# Patient Record
Sex: MALE | Race: WHITE
[De-identification: names, ages, dates, MRNs, and addresses within clinical notes are randomized per-mention and may not be internally consistent; named-entity substitution may affect disease eponyms.]

---

## 2020-11-10 PROBLEM — Z00.00 ENCOUNTER FOR PREVENTIVE HEALTH EXAMINATION: Status: ACTIVE | Noted: 2020-11-10

## 2020-11-12 ENCOUNTER — APPOINTMENT (OUTPATIENT)
Dept: ORTHOPEDIC SURGERY | Facility: CLINIC | Age: 55
End: 2020-11-12
Payer: COMMERCIAL

## 2020-11-12 VITALS
BODY MASS INDEX: 31.49 KG/M2 | HEIGHT: 65 IN | SYSTOLIC BLOOD PRESSURE: 133 MMHG | TEMPERATURE: 97.1 F | HEART RATE: 67 BPM | WEIGHT: 189 LBS | DIASTOLIC BLOOD PRESSURE: 83 MMHG

## 2020-11-12 DIAGNOSIS — M75.41 IMPINGEMENT SYNDROME OF RIGHT SHOULDER: ICD-10-CM

## 2020-11-12 DIAGNOSIS — Z78.9 OTHER SPECIFIED HEALTH STATUS: ICD-10-CM

## 2020-11-12 PROCEDURE — 99203 OFFICE O/P NEW LOW 30 MIN: CPT

## 2020-11-12 PROCEDURE — 73030 X-RAY EXAM OF SHOULDER: CPT | Mod: RT

## 2020-11-16 NOTE — PHYSICAL EXAM
[de-identified] : Right Shoulder:  \par Range of Motion in Degrees:	\par 	                                  Claimant:	Normal:	\par Abduction (Active)	                  180	               180 degrees	\par Abduction (Passive)	  180	               180 degrees	\par Forward elevation (Active):	  180	               180 degrees	\par Forward elevation (Passive):	  180	               180 degrees	\par External rotation (Active):	   45	               45 degrees	\par External rotation (Passive):	   45	               45 degrees	\par Internal rotation (Active):	   L-1	               L-1	\par Internal rotation (Passive):	   L-1	               L-1	\par  \par No motor weakness to internal rotation, external rotation or abduction in the scapular plane.  Negative crank test.  Negative O’Luis’s test.  Negative Speed’s test. Negative Yergason’s test.  Negative cross arm test.  No tenderness to palpation at the AC joint. Positive Hawkin’s sign.  Positive Neer’s sign. Negative apprehension. Negative sulcus sign.  No gross neurological or vascular deficits distally.  Skin is intact.  No rashes, scars or lesions. 2+ radial and ulnar pulses. No extra-articular swelling or tenderness.\par  \par Left Shoulder: \par Range of Motion in Degrees:   	\par    	                        Claimant:  	Normal:  	\par Abduction (Active)  	180  	180 degrees  	\par Abduction (Passive)  	180  	180 degrees  	\par Forward elevation (Active):  	180  	180 degrees  	\par Forward elevation (Passive):  180  	180 degrees  	\par External rotation (Active):  	45  	45 degrees  	\par External rotation (Passive):  	45  	45 degrees  	\par Internal rotation (Active):  	L-1  	L-1  	\par Internal rotation (Passive):  	L-1  	L-1  \par 	\par No motor weakness to internal rotation, external rotation or abduction in the scapular plane.  Negative crank test.  Negative O’Luis’s test.  Negative Speed’s test. Negative Yergason’s test.  Negative cross arm test.  No tenderness to palpation at the AC joint. Negative Hawkin’s sign.  Negative Neer’s sign.  Negative apprehension. Negative sulcus sign.  No gross neurological or vascular deficits distally.  Skin is intact.  No rashes, scars or lesions. 2+ radial and ulnar pulses. No extra-articular swelling or tenderness.\par   [de-identified] : Ambulating with a normal gait.  Station:  Normal.  [de-identified] : Appearance:  Well-developed, well-nourished male in no acute distress.\par   [de-identified] : Radiographs, two views of the right shoulder, reveals no acute fractures or dislocations noted.  He does look like he has some old anchors in there from an old surgery.

## 2020-11-16 NOTE — HISTORY OF PRESENT ILLNESS
[(Does not interfere) 0] : the ailment interference is 0/10 (does not interfere) [5] : the ailment interference is 5/10 [de-identified] : The patient comes in today with complaints of right shoulder pain.  The patient states the onset/injury occurred on 10/12/20.  This injury is not work related or due to an automobile accident.  The patient states the pain is sharp.  The patient describes the pain as sharp.  The patient notes ice makes his symptoms better, while using the shoulder makes his symptoms worse.  The patient indicates a pain level of 2 on a pain scale of 0-10.    [] : No

## 2020-11-16 NOTE — DISCUSSION/SUMMARY
[de-identified] : At this time, due to impingement of the right shoulder, the patient was given a cortisone injection and advised in ice and anti-inflammatories.  He will return to the office.

## 2020-11-16 NOTE — ADDENDUM
[FreeTextEntry1] : This note was dictated by Nargis Chong, OTR/L, PA \par \par This note was written by Angi Bailey on 11/16/2020 acting as a scribe for BRADEN STANLEY III, MD

## 2021-02-12 ENCOUNTER — TRANSCRIPTION ENCOUNTER (OUTPATIENT)
Age: 56
End: 2021-02-12

## 2021-12-01 ENCOUNTER — TRANSCRIPTION ENCOUNTER (OUTPATIENT)
Age: 56
End: 2021-12-01

## 2022-08-19 ENCOUNTER — OUTPATIENT (OUTPATIENT)
Dept: OUTPATIENT SERVICES | Facility: HOSPITAL | Age: 57
LOS: 1 days | End: 2022-08-19
Payer: COMMERCIAL

## 2022-08-19 ENCOUNTER — APPOINTMENT (OUTPATIENT)
Dept: ULTRASOUND IMAGING | Facility: CLINIC | Age: 57
End: 2022-08-19

## 2022-08-19 ENCOUNTER — RESULT REVIEW (OUTPATIENT)
Age: 57
End: 2022-08-19

## 2022-08-19 ENCOUNTER — APPOINTMENT (OUTPATIENT)
Dept: ORTHOPEDIC SURGERY | Facility: CLINIC | Age: 57
End: 2022-08-19

## 2022-08-19 DIAGNOSIS — M79.89 OTHER SPECIFIED SOFT TISSUE DISORDERS: ICD-10-CM

## 2022-08-19 PROCEDURE — 73560 X-RAY EXAM OF KNEE 1 OR 2: CPT | Mod: LT

## 2022-08-19 PROCEDURE — 93971 EXTREMITY STUDY: CPT | Mod: 26,LT

## 2022-08-19 PROCEDURE — 93971 EXTREMITY STUDY: CPT

## 2022-08-19 PROCEDURE — 99213 OFFICE O/P EST LOW 20 MIN: CPT

## 2022-08-22 NOTE — DISCUSSION/SUMMARY
[de-identified] : At this time, due to osteoarthritis of the left knee, as well as calf swelling, the patient will get a Doppler to rule out DVT.  He will return to the office to discuss possible gel injections.

## 2022-08-22 NOTE — ADDENDUM
[FreeTextEntry1] : This note was written by Angi Bailey on 08/22/2022 acting as scribe for Nargis Chong, OTR/L, PA

## 2022-08-22 NOTE — PHYSICAL EXAM
[de-identified] : Left Knee:\par Range of Motion in Degrees	\par 	                  Claimant:	Normal:	\par Flexion Active	  135 	                135-degrees	\par Flexion Passive	  135	                135-degrees	\par Extension Active	  0-5	                0-5-degrees	\par Extension Passive	  0-5	                0-5-degrees	\par \par Medial joint line pain. Tightness going down to the calf with swelling. He feels soft in the calf. No weakness to flexion/extension.  No evidence of instability in the AP plane or varus or valgus stress.  Negative  Lachman.  Negative pivot shift.  Negative anterior drawer test.  Negative posterior drawer test.  Negative Rajiv.  Negative Apley grind. Positive patellofemoral crepitations.  No lateral tilting patella.  No patella apprehension. No proximal or distal swelling, edema or tenderness.  No gross motor or sensory deficits. 2+ DP and PT pulses.  No varus or valgus malalignment.  Skin is intact.  No rashes, scars or lesions.   \par  [de-identified] : Ambulating with a slightly antalgic to antalgic gait.  Station:  Normal.  [de-identified] : Appearance:  Well-developed, well-nourished male in no acute distress.\par \par   [de-identified] : Radiographs, which were taken in the office, two views of the left knee, reveals bone-on-bone osteoarthritis.

## 2022-08-23 ENCOUNTER — APPOINTMENT (OUTPATIENT)
Dept: ORTHOPEDIC SURGERY | Facility: CLINIC | Age: 57
End: 2022-08-23

## 2022-08-23 ENCOUNTER — FORM ENCOUNTER (OUTPATIENT)
Age: 57
End: 2022-08-23

## 2022-08-23 PROCEDURE — 20610 DRAIN/INJ JOINT/BURSA W/O US: CPT | Mod: LT

## 2022-08-23 RX ORDER — HYALURONATE SODIUM 20 MG/2 ML
20 SYRINGE (ML) INTRAARTICULAR
Qty: 3 | Refills: 0 | Status: ACTIVE | OUTPATIENT
Start: 2022-08-23

## 2022-08-25 NOTE — PHYSICAL EXAM
[de-identified] : Left Knee:\par Knee: Range of Motion in Degrees	\par 	                  Claimant:	Normal:	\par Flexion Active	  135 	                135-degrees	\par Flexion Passive	  135	                135-degrees	\par Extension Active	  0-5	                0-5-degrees	\par Extension Passive	  0-5	                0-5-degrees	\par \par No weakness to flexion/extension.  No evidence of instability in the AP plane or varus or valgus stress.  Negative  Lachman.  Negative pivot shift.  Negative anterior drawer test.  Negative posterior drawer test.  Negative Rajiv.  Negative Apley grind.  No medial or lateral joint line tenderness.  Positive tenderness over the medial and lateral facet of the patella.  Positive patellofemoral crepitations.  No lateral tilting patella.  No patella apprehension.  Positive crepitation in the medial and lateral femoral condyle.  No proximal or distal swelling, edema or tenderness.  No gross motor or sensory deficits.  Mild intra-articular swelling.  2+ DP and PT pulses.  No varus or valgus malalignment.  Skin is intact.  No rashes, scars or lesions.  \par   [de-identified] : Gait and Station:  Ambulating with a slightly antalgic to antalgic gait.  Station:  Normal.  [de-identified] : Appearance:  Well-developed, well-nourished male in no acute distress.\par

## 2022-08-25 NOTE — PROCEDURE
[de-identified] : Indications:  \par Osteoarthritis left knee\par \par Consent: \par At this time, I have recommended an injection to the left knee.  The risks and benefits of the procedure were discussed with the patient in detail.  Upon verbal consent of the patient, we proceeded with the injection as noted below.  \par \par Description of Procedure:  \par After a sterile prep, the patient underwent an injection of 9 cc of 1% Lidocaine without epinephrine and 1 cc of Kenalog (40 mg/mL) into the left knee.  The patient tolerated the procedure well.  There were no complications.  \par \par :  [employer]par Drug Name:  Triamcinolone Acetonide Injectable Suspension USP\par NCD#:  3545-8805-77\par Lot#:  [id number]\par Expiration Date:  09/23\par

## 2022-08-25 NOTE — ADDENDUM
[FreeTextEntry1] : This note was written by Leigh Vargas on 08/25/2022 acting as scribe for Nargis Chong, OTR/L, PA

## 2022-08-25 NOTE — DISCUSSION/SUMMARY
[de-identified] : At this time, due to osteoarthritis of the left knee, the patient was given a cortisone injection.  We will order Euflexxa injections for the left knee and he will return to the office when these come in.

## 2022-08-25 NOTE — HISTORY OF PRESENT ILLNESS
[de-identified] : The patient comes in today for his left knee.  He had an ultrasound to make sure he did not have a DVT.  DVT was ruled out, but ultrasound did show a Baker's cyst.  He returns today to get a cortisone injection.

## 2022-10-04 ENCOUNTER — APPOINTMENT (OUTPATIENT)
Dept: ORTHOPEDIC SURGERY | Facility: CLINIC | Age: 57
End: 2022-10-04

## 2022-10-04 PROCEDURE — 20610 DRAIN/INJ JOINT/BURSA W/O US: CPT | Mod: LT

## 2022-10-10 NOTE — PHYSICAL EXAM
[de-identified] : Left Knee:\par Knee: Range of Motion in Degrees	\par 	  Claimant:	Normal:	\par Flexion Active	 135 	 135-degrees	\par Flexion Passive	 135	 135-degrees	\par Extension Active	 0-5	 0-5-degrees	\par Extension Passive	 0-5	 0-5-degrees	\par \par No weakness to flexion/extension. No evidence of instability in the AP plane or varus or valgus stress. Negative Lachman. Negative pivot shift. Negative anterior drawer test. Negative posterior drawer test. Negative Rajiv. Negative Apley grind. No medial or lateral joint line tenderness. Positive tenderness over the medial and lateral facet of the patella. Positive patellofemoral crepitations. No lateral tilting patella. No patella apprehension. Positive crepitation in the medial and lateral femoral condyle. No proximal or distal swelling, edema or tenderness. No gross motor or sensory deficits. Mild intra-articular swelling. 2+ DP and PT pulses. No varus or valgus malalignment. Skin is intact. No rashes, scars or lesions.

## 2022-10-10 NOTE — PROCEDURE
[de-identified] : Indication: \par Osteoarthritis of the left knee\par \par Consent:\par The risks and benefits of the procedure were discussed with the patient in detail.  Upon verbal consent of the patient, we proceeded with the Euflexxa injection as noted below.  \par \par Description of Procedure:\par Under sterile conditions, the patient underwent a Euflexxa injection to the left knee of 20 mg sodium Hyaluronate, 17 mg sodium chloride, 1.12 mg disodium hydrogen phosphate dodecahydrate, .10 mg sodium dihydrogen phosphate dehydrate in a 2 mL syringe without any complications.  The patient tolerated this well. \par \par :  [employer]\par NDC#:  [address]-7\par Lot#:    [id number]\par Exp Date:  09/10/2023\par \par Plan: \par I have recommended ice and elevation.  The patient will be reassessed in one week for the next Euflexxa injection for the osteoarthritis of the left knee. \par

## 2022-10-10 NOTE — ADDENDUM
[FreeTextEntry1] : This note was written by Leigh Vargas on 10/10/2022 acting as scribe for Nargis Chong, OTR/L, PA

## 2022-10-11 ENCOUNTER — APPOINTMENT (OUTPATIENT)
Dept: ORTHOPEDIC SURGERY | Facility: CLINIC | Age: 57
End: 2022-10-11

## 2022-10-11 PROCEDURE — 20610 DRAIN/INJ JOINT/BURSA W/O US: CPT | Mod: LT

## 2022-10-12 NOTE — PHYSICAL EXAM
[de-identified] : Left Knee:\par Knee: Range of Motion in Degrees	\par 	  Claimant:	Normal:	\par Flexion Active	 135 	 135-degrees	\par Flexion Passive	 135	 135-degrees	\par Extension Active	 0-5	 0-5-degrees	\par Extension Passive	 0-5	 0-5-degrees	\par \par No weakness to flexion/extension. No evidence of instability in the AP plane or varus or valgus stress. Negative Lachman. Negative pivot shift. Negative anterior drawer test. Negative posterior drawer test. Negative Rajiv. Negative Apley grind. No medial or lateral joint line tenderness. Positive tenderness over the medial and lateral facet of the patella. Positive patellofemoral crepitations. No lateral tilting patella. No patella apprehension. Positive crepitation in the medial and lateral femoral condyle. No proximal or distal swelling, edema or tenderness. No gross motor or sensory deficits. Mild intra-articular swelling. 2+ DP and PT pulses. No varus or valgus malalignment. Skin is intact. No rashes, scars or lesions.

## 2022-10-12 NOTE — PROCEDURE
[de-identified] : Indication: \par Osteoarthritis of the left knee\par \par Consent:\par The risks and benefits of the procedure were discussed with the patient in detail.  Upon verbal consent of the patient, we proceeded with the Euflexxa injection as noted below.  \par \par Description of Procedure:\par Under sterile conditions, the patient underwent a Euflexxa injection to the left knee of 20 mg sodium Hyaluronate, 17 mg sodium chloride, 1.12 mg disodium hydrogen phosphate dodecahydrate, .10 mg sodium dihydrogen phosphate dehydrate in a 2 mL syringe without any complications.  The patient tolerated this well. \par \par :  [employer]\par NDC#:  [address]-0\par Lot#:    [id number]\par Exp Date:  09/10/23\par \par Plan: \par I have recommended ice and elevation.  The patient will be reassessed in one week for the next Euflexxa injection for the osteoarthritis of the left knee. \par

## 2022-10-12 NOTE — ADDENDUM
[FreeTextEntry1] : This note was written by Carmenza Nayak on 10/12/2022 acting as scribe for Nargis Chong, NOHELIAR/KATHARINA, PA.\par

## 2022-10-18 ENCOUNTER — APPOINTMENT (OUTPATIENT)
Dept: ORTHOPEDIC SURGERY | Facility: CLINIC | Age: 57
End: 2022-10-18

## 2022-10-18 DIAGNOSIS — M17.12 UNILATERAL PRIMARY OSTEOARTHRITIS, LEFT KNEE: ICD-10-CM

## 2022-10-18 PROCEDURE — 20610 DRAIN/INJ JOINT/BURSA W/O US: CPT | Mod: LT

## 2022-10-19 NOTE — ADDENDUM
[FreeTextEntry1] : This note was written by Carmenza Nayak on 10/19/2022 acting as scribe for Nargis Chong, NOHELIAR/KATHARINA, PA.\par

## 2022-10-19 NOTE — PHYSICAL EXAM
[de-identified] : Left Knee:\par Knee: Range of Motion in Degrees	\par 	  Claimant:	Normal:	\par Flexion Active	 135 	 135-degrees	\par Flexion Passive	 135	 135-degrees	\par Extension Active	 0-5	 0-5-degrees	\par Extension Passive	 0-5	 0-5-degrees	\par \par No weakness to flexion/extension. No evidence of instability in the AP plane or varus or valgus stress. Negative Lachman. Negative pivot shift. Negative anterior drawer test. Negative posterior drawer test. Negative Rajiv. Negative Apley grind. No medial or lateral joint line tenderness. Positive tenderness over the medial and lateral facet of the patella. Positive patellofemoral crepitations. No lateral tilting patella. No patella apprehension. Positive crepitation in the medial and lateral femoral condyle. No proximal or distal swelling, edema or tenderness. No gross motor or sensory deficits. Mild intra-articular swelling. 2+ DP and PT pulses. No varus or valgus malalignment. Skin is intact. No rashes, scars or lesions.

## 2022-10-19 NOTE — PROCEDURE
[de-identified] : Consent:\par The risks and benefits of the procedure were discussed with the patient in detail.  Upon verbal consent of the patient, we proceeded with the Euflexxa injection as noted below.  \par \par \par Procedure:\par Under sterile conditions, the patient underwent a Euflexxa injection to the left knee of 20 mg sodium hyaluronate, 17 mg sodium chloride, 1.12 mg disodium hydrogen phosphate dodecahydrate, .10 mg sodium dihydrogen phosphate dehydrate in a 2 mL syringe without any complications.  The patient tolerated this well. \par \par Indications:  Osteoarthritis, left knee\par :  [employer]\par NDC#:  [address]-8\par Lot#:    [id number]\par Exp Date:  09/10/23\par \par Plan: \par I have recommended ice and elevation.  The patient will be reassessed in six to eight weeks for the osteoarthritis of the left knee. \par

## 2022-10-26 PROBLEM — M17.12 PRIMARY OSTEOARTHRITIS OF LEFT KNEE: Status: ACTIVE | Noted: 2022-08-19

## 2022-11-07 NOTE — H&P ADULT - NSHPLABSRESULTS_GEN_ALL_CORE
11/8/2022 EKG      10/12/2022: Cardiac Pet  Moderate sized mid to basal inferior wall reversible defect.     10/07/2022: Echo    LVEF: 60-65% 11/8/2022 EKG NSR rate 77      10/12/2022: Cardiac Pet  Moderate sized mid to basal inferior wall reversible defect.     10/07/2022: Echo    LVEF: 60-65%

## 2022-11-07 NOTE — ASU PATIENT PROFILE, ADULT - FALL HARM RISK - UNIVERSAL INTERVENTIONS
Bed in lowest position, wheels locked, appropriate side rails in place/Call bell, personal items and telephone in reach/Instruct patient to call for assistance before getting out of bed or chair/Non-slip footwear when patient is out of bed/[place] to call system/Physically safe environment - no spills, clutter or unnecessary equipment/Purposeful Proactive Rounding/Room/bathroom lighting operational, light cord in reach

## 2022-11-07 NOTE — H&P ADULT - ASSESSMENT
56 y/o male with PMH obesity presented to cardiology with c/o 2 weeks of intermittent chest pain and palpitations. Pt had cardiac PET revealing evidence of stress induced ischemia. Pt referred to cardiac cath for further evaluation  ASA class:  Creatinine:  GFR:  Bleeding  Risk score:  Nba Score:  58 y/o male with PMH obesity presented to cardiology with c/o 2 weeks of intermittent chest pain and palpitations. Pt had cardiac PET revealing evidence of stress induced ischemia. Pt referred to cardiac cath for further evaluation    ASA class: II  Creatinine: 1.18  GFR: 72  Bleeding  Risk score: 1  Nba Score: 1

## 2022-11-07 NOTE — H&P ADULT - PROBLEM SELECTOR PLAN 1
with intermittent chest pain  [facility] with possible PCI  ARTUR prehydration protocol NS @ 250cc x 1 bolus   -Consent obtained for cardiac catheterization w/ coronary angiogram and possible stent placement. Pt is competent, has capacity, and understands risks and benefits of procedure. Risks and benefits discussed. Risk discussed included, but not limited to MI, stroke, mortality, major bleeding, arrythmia, or infection. All questions answered

## 2022-11-07 NOTE — H&P ADULT - HISTORY OF PRESENT ILLNESS
56 y/o male with PMH obesity presented to cardiology with c/o 2 weeks of intermittent chest pain and palpitations. Pt had cardiac PET revealing evidence of stress induced ischemia. Pt referred to cardiac cath for further evaluation  Covid PCR neg

## 2022-11-08 ENCOUNTER — OUTPATIENT (OUTPATIENT)
Dept: OUTPATIENT SERVICES | Facility: HOSPITAL | Age: 57
LOS: 1 days | Discharge: HOME | End: 2022-11-08
Payer: COMMERCIAL

## 2022-11-08 VITALS
TEMPERATURE: 98 F | HEART RATE: 84 BPM | DIASTOLIC BLOOD PRESSURE: 78 MMHG | RESPIRATION RATE: 16 BRPM | SYSTOLIC BLOOD PRESSURE: 149 MMHG | OXYGEN SATURATION: 100 % | WEIGHT: 205.03 LBS

## 2022-11-08 DIAGNOSIS — R94.39 ABNORMAL RESULT OF OTHER CARDIOVASCULAR FUNCTION STUDY: ICD-10-CM

## 2022-11-08 PROCEDURE — 36415 COLL VENOUS BLD VENIPUNCTURE: CPT

## 2022-11-08 PROCEDURE — 85027 COMPLETE CBC AUTOMATED: CPT

## 2022-11-08 PROCEDURE — C1725: CPT

## 2022-11-08 PROCEDURE — C1887: CPT

## 2022-11-08 PROCEDURE — 86900 BLOOD TYPING SEROLOGIC ABO: CPT

## 2022-11-08 PROCEDURE — C1874: CPT

## 2022-11-08 PROCEDURE — 80048 BASIC METABOLIC PNL TOTAL CA: CPT

## 2022-11-08 PROCEDURE — 93458 L HRT ARTERY/VENTRICLE ANGIO: CPT | Mod: 59

## 2022-11-08 PROCEDURE — 86901 BLOOD TYPING SEROLOGIC RH(D): CPT

## 2022-11-08 PROCEDURE — 92978 ENDOLUMINL IVUS OCT C 1ST: CPT | Mod: RC

## 2022-11-08 PROCEDURE — 93005 ELECTROCARDIOGRAM TRACING: CPT

## 2022-11-08 PROCEDURE — C9600: CPT | Mod: RC

## 2022-11-08 PROCEDURE — 86850 RBC ANTIBODY SCREEN: CPT

## 2022-11-08 PROCEDURE — C1753: CPT

## 2022-11-08 PROCEDURE — 93010 ELECTROCARDIOGRAM REPORT: CPT

## 2022-11-08 PROCEDURE — C1894: CPT

## 2022-11-08 PROCEDURE — C1769: CPT

## 2022-11-08 PROCEDURE — 86803 HEPATITIS C AB TEST: CPT

## 2022-11-08 PROCEDURE — C1761: CPT

## 2022-11-08 RX ORDER — ASPIRIN/CALCIUM CARB/MAGNESIUM 324 MG
81 TABLET ORAL ONCE
Refills: 0 | Status: DISCONTINUED | OUTPATIENT
Start: 2022-11-08 | End: 2022-11-08

## 2022-11-08 RX ORDER — SODIUM CHLORIDE 9 MG/ML
250 INJECTION INTRAMUSCULAR; INTRAVENOUS; SUBCUTANEOUS ONCE
Refills: 0 | Status: COMPLETED | OUTPATIENT
Start: 2022-11-08 | End: 2022-11-08

## 2022-11-08 RX ORDER — CLOPIDOGREL BISULFATE 75 MG/1
75 TABLET, FILM COATED ORAL DAILY
Refills: 0 | Status: DISCONTINUED | OUTPATIENT
Start: 2022-11-08 | End: 2022-11-09

## 2022-11-08 RX ORDER — ATORVASTATIN CALCIUM 80 MG/1
20 TABLET, FILM COATED ORAL AT BEDTIME
Refills: 0 | Status: DISCONTINUED | OUTPATIENT
Start: 2022-11-08 | End: 2022-11-09

## 2022-11-08 RX ORDER — ASPIRIN/CALCIUM CARB/MAGNESIUM 324 MG
81 TABLET ORAL DAILY
Refills: 0 | Status: DISCONTINUED | OUTPATIENT
Start: 2022-11-08 | End: 2022-11-09

## 2022-11-08 RX ORDER — SODIUM CHLORIDE 9 MG/ML
1000 INJECTION INTRAMUSCULAR; INTRAVENOUS; SUBCUTANEOUS
Refills: 0 | Status: DISCONTINUED | OUTPATIENT
Start: 2022-11-08 | End: 2022-11-09

## 2022-11-08 RX ADMIN — ATORVASTATIN CALCIUM 20 MILLIGRAM(S): 80 TABLET, FILM COATED ORAL at 21:17

## 2022-11-08 RX ADMIN — SODIUM CHLORIDE 250 MILLILITER(S): 9 INJECTION INTRAMUSCULAR; INTRAVENOUS; SUBCUTANEOUS at 18:29

## 2022-11-08 RX ADMIN — SODIUM CHLORIDE 250 MILLILITER(S): 9 INJECTION INTRAMUSCULAR; INTRAVENOUS; SUBCUTANEOUS at 12:00

## 2022-11-08 NOTE — CHART NOTE - NSCHARTNOTEFT_GEN_A_CORE
Nurse Practitioner Progress note:     HPI:  58 y/o male with PMH obesity presented to cardiology with c/o 2 weeks of intermittent chest pain and palpitations. Pt had cardiac PET revealing evidence of stress induced ischemia. Pt referred to cardiac cath for further evaluation  Covid PCR neg   (07 Nov 2022 14:09)    T(C): 36.7 (11-08-22 @ 11:53), Max: 36.7 (11-08-22 @ 11:53)  HR: 66 (11-08-22 @ 17:25) (66 - 84)  BP: 110/69 (11-08-22 @ 17:25) (99/36 - 149/78)  RR: 16 (11-08-22 @ 17:25) (16 - 16)  SpO2: 99% (11-08-22 @ 17:25) (98% - 100%)  Wt(kg): --    PHYSICAL EXAM:  Neurologic: Non-focal, AxOx3.  No neuro deficits  Vascular: Peripheral pulses palpable 2+ bilaterally  Procedure Site: Rt. radial band in place site benign soft no bleeding no hematoma  +1 radial pulse no c/o numbness/tingling, <3sec cap refill, fingers/hand warm to touch        PROCEDURE RESULTS:  < from: Cardiac Catheterization (11.08.22 @ 14:37) >   Impression   Diagnostic Conclusions   Two Vessel coronary artery disease (LAD&RCA) .   Normal left ventricular systolic function without segmental wall motion   abnormalities. Estimated LV ejection fraction is 60 %.   Normal left ventricular end-diastolic pressure 12 mm Hg.   Interventional Conclusions   Successful Coronary Intervention NEGIN of proximal RCA.        ASSESSMENT/PLAN: 	  58 y/o male with PMH obesity presented to cardiology with c/o 2 weeks of intermittent chest pain and palpitations. Pt had cardiac PET revealing evidence of stress induced ischemia. S/P C S/P NEGIN   -Admit to CICU  -VS, labs, diet, activity as per PCI orders  -IV hydration  -Encourage PO fluids  -Aggressive medical management of coronary artery disease and its underlying risk factors.  -Observation overnight.  -ASA 81mg PO   -Plavix 75mg  -Lipitor 20mg   -Discussed therapeutic lifestyle changes to reduce risk factors such as following a cardiac diet, weight loss, maintaining a healthy weight, exercise, smoking cessation, medication compliance, and regular follow-up  with MD to know our numbers (BP, cholesterol, weight, and glucose  -If pt. remains stable overnight possible D/C in AM  - Follow-up AM labs/EKG/site check   Follow up visit in 2 weeks .
Patient is a 57y old  Male who presents with a chief complaint of chest pain (07 Nov 2022 14:09)  s/p LHC with NEGIN to RCA   status post cath via RRA.  Site clean, dry, intact. Pulses present, capillary refill appropriate.  no signs of hematoma present, surrounding area soft. VSS no c/o pain.  Post cath instructions and medications reviewed, patient verbalizes and understands instructions. Patient resting comfortably in bed.   Cath team to follow up in AM
no

## 2022-11-09 ENCOUNTER — TRANSCRIPTION ENCOUNTER (OUTPATIENT)
Age: 57
End: 2022-11-09

## 2022-11-09 VITALS
OXYGEN SATURATION: 98 % | HEART RATE: 72 BPM | SYSTOLIC BLOOD PRESSURE: 120 MMHG | TEMPERATURE: 98 F | RESPIRATION RATE: 18 BRPM | DIASTOLIC BLOOD PRESSURE: 70 MMHG

## 2022-11-09 DIAGNOSIS — I25.10 ATHEROSCLEROTIC HEART DISEASE OF NATIVE CORONARY ARTERY WITHOUT ANGINA PECTORIS: ICD-10-CM

## 2022-11-09 DIAGNOSIS — R94.39 ABNORMAL RESULT OF OTHER CARDIOVASCULAR FUNCTION STUDY: ICD-10-CM

## 2022-11-09 LAB
ANION GAP SERPL CALC-SCNC: 4 MMOL/L — LOW (ref 5–17)
BUN SERPL-MCNC: 11 MG/DL — SIGNIFICANT CHANGE UP (ref 7–23)
CALCIUM SERPL-MCNC: 9.1 MG/DL — SIGNIFICANT CHANGE UP (ref 8.5–10.1)
CHLORIDE SERPL-SCNC: 108 MMOL/L — SIGNIFICANT CHANGE UP (ref 96–108)
CO2 SERPL-SCNC: 29 MMOL/L — SIGNIFICANT CHANGE UP (ref 22–31)
CREAT SERPL-MCNC: 0.96 MG/DL — SIGNIFICANT CHANGE UP (ref 0.5–1.3)
EGFR: 92 ML/MIN/1.73M2 — SIGNIFICANT CHANGE UP
GLUCOSE SERPL-MCNC: 94 MG/DL — SIGNIFICANT CHANGE UP (ref 70–99)
HCT VFR BLD CALC: 42.6 % — SIGNIFICANT CHANGE UP (ref 39–50)
HCV AB S/CO SERPL IA: 0.12 S/CO — SIGNIFICANT CHANGE UP (ref 0–0.99)
HCV AB SERPL-IMP: SIGNIFICANT CHANGE UP
HGB BLD-MCNC: 14.3 G/DL — SIGNIFICANT CHANGE UP (ref 13–17)
MCHC RBC-ENTMCNC: 28.9 PG — SIGNIFICANT CHANGE UP (ref 27–34)
MCHC RBC-ENTMCNC: 33.6 GM/DL — SIGNIFICANT CHANGE UP (ref 32–36)
MCV RBC AUTO: 86.1 FL — SIGNIFICANT CHANGE UP (ref 80–100)
PLATELET # BLD AUTO: 234 K/UL — SIGNIFICANT CHANGE UP (ref 150–400)
POTASSIUM SERPL-MCNC: 4.7 MMOL/L — SIGNIFICANT CHANGE UP (ref 3.5–5.3)
POTASSIUM SERPL-SCNC: 4.7 MMOL/L — SIGNIFICANT CHANGE UP (ref 3.5–5.3)
RBC # BLD: 4.95 M/UL — SIGNIFICANT CHANGE UP (ref 4.2–5.8)
RBC # FLD: 13.8 % — SIGNIFICANT CHANGE UP (ref 10.3–14.5)
SODIUM SERPL-SCNC: 141 MMOL/L — SIGNIFICANT CHANGE UP (ref 135–145)
WBC # BLD: 9.63 K/UL — SIGNIFICANT CHANGE UP (ref 3.8–10.5)
WBC # FLD AUTO: 9.63 K/UL — SIGNIFICANT CHANGE UP (ref 3.8–10.5)

## 2022-11-09 PROCEDURE — 93010 ELECTROCARDIOGRAM REPORT: CPT

## 2022-11-09 RX ORDER — ATORVASTATIN CALCIUM 80 MG/1
1 TABLET, FILM COATED ORAL
Qty: 30 | Refills: 0
Start: 2022-11-09 | End: 2022-12-08

## 2022-11-09 RX ORDER — CLOPIDOGREL BISULFATE 75 MG/1
1 TABLET, FILM COATED ORAL
Qty: 30 | Refills: 11
Start: 2022-11-09 | End: 2023-11-03

## 2022-11-09 RX ORDER — ASPIRIN/CALCIUM CARB/MAGNESIUM 324 MG
1 TABLET ORAL
Qty: 0 | Refills: 0 | DISCHARGE
Start: 2022-11-09

## 2022-11-09 RX ADMIN — Medication 81 MILLIGRAM(S): at 11:06

## 2022-11-09 RX ADMIN — CLOPIDOGREL BISULFATE 75 MILLIGRAM(S): 75 TABLET, FILM COATED ORAL at 11:06

## 2022-11-09 NOTE — DISCHARGE NOTE PROVIDER - CARE PROVIDER_API CALL
Jorge Hernandez (DO)  Cardiology  [address] [place] [address]  Phone: [phone]  Fax: [phone]  Follow Up Time: 1 week

## 2022-11-09 NOTE — DISCHARGE NOTE PROVIDER - NSDCCPTREATMENT_GEN_ALL_CORE_FT
PRINCIPAL PROCEDURE  Procedure: Percutaneous coronary intervention, with stent insertion  Findings and Treatment: RCA stent

## 2022-11-09 NOTE — DISCHARGE NOTE NURSING/CASE MANAGEMENT/SOCIAL WORK - NSDCPEFALRISK_GEN_ALL_CORE
For information on Fall & Injury Prevention, visit: [email or web address] OR  [email or web address] OR  https://www.cdc.gov/steadi/patient.html

## 2022-11-09 NOTE — DISCHARGE NOTE NURSING/CASE MANAGEMENT/SOCIAL WORK - PATIENT PORTAL LINK FT
You can access the FollowMyHealth Patient Portal offered by [facility] by registering at the following website: [email or web address]. By joining [employer]’s FollowMyHealth portal, you will also be able to view your health information using other applications (apps) compatible with our system.

## 2022-11-09 NOTE — DISCHARGE NOTE PROVIDER - NSDCMRMEDTOKEN_GEN_ALL_CORE_FT
aspirin 81 mg oral tablet, chewable: 1 tab(s) orally once a day  atorvastatin 20 mg oral tablet: 1 tab(s) orally once a day (at bedtime)  clopidogrel 75 mg oral tablet: 1 tab(s) orally once a day

## 2022-11-09 NOTE — DISCHARGE NOTE PROVIDER - HOSPITAL COURSE
56 y/o male with PMH obesity presented to cardiology with c/o 2 weeks of intermittent chest pain and palpitations. Pt had cardiac PET revealing evidence of stress induced ischemia. S/P LHC S/P NEGIN to RCA.  Denies CP, SOB, palpitation. Tolerated  ambulation

## 2022-11-09 NOTE — PROGRESS NOTE ADULT - SUBJECTIVE AND OBJECTIVE BOX
Nurse Practitioner Progress note:   s/p PCI and NEGIN to RCA on 11/8/22. Denies chest pain, shortness of breath, dizziness or palpitations  PHYSICAL EXAM:    Constitutional: NAD  Neuro: Alert and oriented x 3 Gait steady Speech clear No focal deficits  Neck: No JVD, supple  Respiratory: CTAB  Cardiovascular: S1 and S2, RRR,   Gastrointestinal: BS+, soft, NT/ND  Extremities: No clubbing cyanosis or edema No varicosities  Vascular: 2+ peripheral pulses  Psychiatric: Normal mood, normal affect  Musculoskeletal: 5/5 strength b/l upper and lower extremities  Right groin procedure site CDI.  no bleeding, no hematoma, site soft, non tender, positive pedal pulses bilaterally  Right radial dsg removed. Procedure site CDI, no bleeding, no hematoma, able to move all digits with capillary refill <2 seconds, fingers warm. Pt reports that his right arm has been bigger than the left for years.       T(C): 36.7 (11-09-22 @ 09:59), Max: 36.7 (11-08-22 @ 11:53)  HR: 72 (11-09-22 @ 09:59) (66 - 84)  BP: 120/70 (11-09-22 @ 09:59) (97/74 - 149/78)  RR: 18 (11-09-22 @ 09:59) (16 - 18)  SpO2: 98% (11-09-22 @ 09:59) (98% - 100%)  Tele: SB  EKG:   CBC Full  -  ( 09 Nov 2022 08:12 )  WBC Count : 9.63 K/uL  RBC Count : 4.95 M/uL  Hemoglobin : 14.3 g/dL  Hematocrit : 42.6 %  Platelet Count - Automated : 234 K/uL  Mean Cell Volume : 86.1 fl  Mean Cell Hemoglobin : 28.9 pg  Mean Cell Hemoglobin Concentration : 33.6 gm/dL  Auto Neutrophil # : x  Auto Lymphocyte # : x  Auto Monocyte # : x  Auto Eosinophil # : x  Auto Basophil # : x  Auto Neutrophil % : x  Auto Lymphocyte % : x  Auto Monocyte % : x  Auto Eosinophil % : x  Auto Basophil % : x    11-09    141  |  108  |  11  ----------------------------<  94  4.7   |  29  |  0.96    Ca    9.1      09 Nov 2022 08:12    MEDICATIONS  (STANDING):  aspirin  chewable 81 milliGRAM(s) Oral daily  atorvastatin 20 milliGRAM(s) Oral at bedtime  clopidogrel Tablet 75 milliGRAM(s) Oral daily  sodium chloride 0.9%. 1000 milliLiter(s) (250 mL/Hr) IV Continuous <Continuous>          HPI: 56 y/o male with PMH obesity presented to cardiology with c/o 2 weeks of intermittent chest pain and palpitations. Pt had cardiac PET revealing evidence of stress induced ischemia. Pt referred to cardiac cath for further evaluation  Covid PCR neg   (07 Nov 2022 14:09)     s/p PCI of RCA    ASSESSMENT/PLAN:    Coronary artery disease  -Discharge home today  -Aspirin 81 mg PO daily  -Plavix 75mg PO daily  -atorvastatin 20mg PO QHS- Pt would further discuss with Dr Hernandez  -Plan of care discussed with patient  -Follow-up with attending MD within 1 week  -Discussed therapeutic lifestyle changes to reduce risk factors such as following a cardiac diet, weight loss, maintaining a healthy weight, exercise, smoking cessation, medication compliance, and regular follow-up  with MD  Nurse Practitioner Progress note:   s/p PCI and NEGIN to RCA on 11/8/22. Denies chest pain, shortness of breath, dizziness or palpitations  PHYSICAL EXAM:    Constitutional: NAD  Neuro: Alert and oriented x 3 Gait steady Speech clear No focal deficits  Neck: No JVD, supple  Respiratory: CTAB  Cardiovascular: S1 and S2, RRR,   Gastrointestinal: BS+, soft, NT/ND  Extremities: No clubbing cyanosis or edema No varicosities  Vascular: 2+ peripheral pulses  Psychiatric: Normal mood, normal affect  Musculoskeletal: 5/5 strength b/l upper and lower extremities  Right groin procedure site CDI.  no bleeding, no hematoma, site soft, non tender, positive pedal pulses bilaterally  Right radial dsg removed. Procedure site CDI, no bleeding, no hematoma, able to move all digits with capillary refill <2 seconds, fingers warm. Pt reports that his right arm has been bigger than the left for years.       T(C): 36.7 (11-09-22 @ 09:59), Max: 36.7 (11-08-22 @ 11:53)  HR: 72 (11-09-22 @ 09:59) (66 - 84)  BP: 120/70 (11-09-22 @ 09:59) (97/74 - 149/78)  RR: 18 (11-09-22 @ 09:59) (16 - 18)  SpO2: 98% (11-09-22 @ 09:59) (98% - 100%)  Tele: SB  EKG:   CBC Full  -  ( 09 Nov 2022 08:12 )  WBC Count : 9.63 K/uL  RBC Count : 4.95 M/uL  Hemoglobin : 14.3 g/dL  Hematocrit : 42.6 %  Platelet Count - Automated : 234 K/uL  Mean Cell Volume : 86.1 fl  Mean Cell Hemoglobin : 28.9 pg  Mean Cell Hemoglobin Concentration : 33.6 gm/dL  Auto Neutrophil # : x  Auto Lymphocyte # : x  Auto Monocyte # : x  Auto Eosinophil # : x  Auto Basophil # : x  Auto Neutrophil % : x  Auto Lymphocyte % : x  Auto Monocyte % : x  Auto Eosinophil % : x  Auto Basophil % : x    11-09    141  |  108  |  11  ----------------------------<  94  4.7   |  29  |  0.96    Ca    9.1      09 Nov 2022 08:12    MEDICATIONS  (STANDING):  aspirin  chewable 81 milliGRAM(s) Oral daily  atorvastatin 20 milliGRAM(s) Oral at bedtime  clopidogrel Tablet 75 milliGRAM(s) Oral daily  sodium chloride 0.9%. 1000 milliLiter(s) (250 mL/Hr) IV Continuous <Continuous>          HPI: 58 y/o male with PMH obesity presented to cardiology with c/o 2 weeks of intermittent chest pain and palpitations. Pt had cardiac PET revealing evidence of stress induced ischemia. Pt referred to cardiac cath for further evaluation  Covid PCR neg   (07 Nov 2022 14:09)     s/p PCI of RCA    ASSESSMENT/PLAN:    Coronary artery disease  -Discharge home today  -Aspirin 81 mg PO daily  -Plavix 75mg PO daily-Rx  -atorvastatin 20mg PO QHS- Pt would further discuss with Dr Hernandez  -Plan of care discussed with patient  -Follow-up with attending MD within 1 week  -Discussed therapeutic lifestyle changes to reduce risk factors such as following a cardiac diet, weight loss, maintaining a healthy weight, exercise, smoking cessation, medication compliance, and regular follow-up  with MD

## 2022-11-09 NOTE — DISCHARGE NOTE PROVIDER - NSDCCPCAREPLAN_GEN_ALL_CORE_FT
PRINCIPAL DISCHARGE DIAGNOSIS  Diagnosis: CAD (coronary artery disease)  Assessment and Plan of Treatment: ASA, Plavix, atorvastatin

## 2024-08-08 ENCOUNTER — NON-APPOINTMENT (OUTPATIENT)
Age: 59
End: 2024-08-08

## 2024-11-26 NOTE — PATIENT PROFILE ADULT - TOBACCO USE
Never smoker [[email or web address]] : A portion of this note was written by [Fox Turner] on 11/25/2024 acting as a scribe for Dr. Perkins.   I have personally reviewed the chart and agree that the record accurately reflects my personal performance of the history, physical exam, assessment, and plan.